# Patient Record
Sex: MALE | Race: WHITE
[De-identification: names, ages, dates, MRNs, and addresses within clinical notes are randomized per-mention and may not be internally consistent; named-entity substitution may affect disease eponyms.]

---

## 2019-01-25 ENCOUNTER — HOSPITAL ENCOUNTER (OUTPATIENT)
Dept: HOSPITAL 92 - TBSIIMAG | Age: 74
Discharge: HOME | End: 2019-01-25
Attending: NEUROLOGICAL SURGERY
Payer: MEDICARE

## 2019-01-25 DIAGNOSIS — M48.062: ICD-10-CM

## 2019-01-25 DIAGNOSIS — M51.26: Primary | ICD-10-CM

## 2019-01-25 DIAGNOSIS — M47.816: ICD-10-CM

## 2019-01-25 PROCEDURE — 72100 X-RAY EXAM L-S SPINE 2/3 VWS: CPT

## 2019-01-25 NOTE — RAD
LUMBAR SPINE SERIES THREE VIEWS INCLUDING LATERAL NEUTRAL, FLEXION AND EXTENSION VIEWS:

 

FINDINGS: 

Vertebral bodies maintain normal height. Moderately severe disc narrowing is seen at all of the verte
bral body levels. There is very limited motion on the extension or flexion views. It is difficult to 
show any definite abnormal motion. There appears to be a minimal spondylolisthesis of L5 on S1. I do 
not see that this has definitely excluded on the flexion or neutral views. 

 

IMPRESSION: 

Arthritic changes of the spine.

 

POS: DUARTE

## 2019-02-13 ENCOUNTER — HOSPITAL ENCOUNTER (OUTPATIENT)
Dept: HOSPITAL 92 - LABBT | Age: 74
Discharge: HOME | End: 2019-02-13
Attending: NEUROLOGICAL SURGERY
Payer: MEDICARE

## 2019-02-13 DIAGNOSIS — M48.061: ICD-10-CM

## 2019-02-13 DIAGNOSIS — Z01.812: Primary | ICD-10-CM

## 2019-02-13 LAB
ANION GAP SERPL CALC-SCNC: 13 MMOL/L (ref 10–20)
APTT PPP: 30.2 SEC (ref 22.9–36.1)
BUN SERPL-MCNC: 12 MG/DL (ref 8.4–25.7)
CALCIUM SERPL-MCNC: 9.8 MG/DL (ref 7.8–10.44)
CHLORIDE SERPL-SCNC: 101 MMOL/L (ref 98–107)
CO2 SERPL-SCNC: 28 MMOL/L (ref 23–31)
CREAT CL PREDICTED SERPL C-G-VRATE: 0 ML/MIN (ref 70–130)
GLUCOSE SERPL-MCNC: 132 MG/DL (ref 83–110)
HGB BLD-MCNC: 15.4 G/DL (ref 14–18)
INR PPP: 1
MCH RBC QN AUTO: 31.3 PG (ref 27–31)
MCV RBC AUTO: 94.9 FL (ref 78–98)
PLATELET # BLD AUTO: 284 THOU/UL (ref 130–400)
POTASSIUM SERPL-SCNC: 4.2 MMOL/L (ref 3.5–5.1)
PROTHROMBIN TIME: 13.6 SEC (ref 12–14.7)
RBC # BLD AUTO: 4.92 MILL/UL (ref 4.7–6.1)
SODIUM SERPL-SCNC: 138 MMOL/L (ref 136–145)
WBC # BLD AUTO: 11.4 THOU/UL (ref 4.8–10.8)

## 2019-02-13 PROCEDURE — 80048 BASIC METABOLIC PNL TOTAL CA: CPT

## 2019-02-13 PROCEDURE — 85610 PROTHROMBIN TIME: CPT

## 2019-02-13 PROCEDURE — 85730 THROMBOPLASTIN TIME PARTIAL: CPT

## 2019-02-13 PROCEDURE — 85027 COMPLETE CBC AUTOMATED: CPT

## 2019-02-13 NOTE — HP
HISTORY OF PRESENT ILLNESS:  Mr. Walsh was last seen in our office in , who

returns today for worsening back pain.  The patient states that he has been through

physical therapy, injections, and medications, and he is progressively getting

worse.  The patient states that he is unable to walk for any length of time and it

is difficult to stand straight up and he is unable to play golf now.  The patient

states that he gets pain in the low back and then worse when he stands.  The patient

is uncomfortable sitting or lying down.  He states that he has numbness in bilateral

toes and he is ready to discuss surgery. 



REVIEW OF SYSTEMS:  A 10-point review of systems has been completed and is negative

other than stated in the above HPI. 



PAST MEDICAL HISTORY:  Hypertension, prostate cancer, gout, measles, mumps, prostate

problems, hypertension, and diverticulitis. 



PAST SURGICAL HISTORY:  Prostate operation, hernia repair, colostomy, shoulder

surgery, tonsillectomy, colostomy reversal, and incisional hernia repair. 



FAMILY HISTORY:  Father was , diagnosed with dementia, hypertension, and

cancer.  Mother is alive at 91, diagnosed with hypertension.  Siblings are alive. 



SOCIAL HISTORY:  The patient is a former smoker.  Drinks alcohol occasionally.  Does

not use any other illicit drugs.  , is retired. 



MEDICATIONS:  

1. Allopurinol.

2. Furosemide.

3. Levothyroxine.

4. Sodium.

5. Irbesartan.



ALLERGIES:  PENICILLIN.



PHYSICAL EXAMINATION:

CONSTITUTIONAL:  Well appearing, well nourished, alert. 

NEUROLOGIC:  Mental status; oriented to time, place, and person.  Normal attention

span and concentration.  Speech is spontaneous and fluent.  Comprehension intact.

Content appropriate.  Normal fund of knowledge.  Cranial nerves; pupils are equal,

round, and reactive to light.  Extraocular movements are intact.  Hearing is intact.

 Motor; muscle strength normal in lower extremities.  Muscle tone and bulk normal in

lower extremities.  5/5 bilateral strength in IP, KE, KF, DF, PF, EHL.  No

radiculopathy.  Negative single leg raise.  Rotation of bilateral hips normal.

Tender to palpate at L5 and paraspinal muscles.  Deep tendon reflexes 2+ patellar,

2+ ankles bilaterally.  Sensory exam intact to light touch.  Normal gait and

station; sit to stand slow, normal gait, forward flexed, difficult to stand

straight. 

RESPIRATIONS:  Normal work of breathing on room air. 

SKIN:  No rash or lesions noted on exposed skin. 

PSYCH:  Normal mood and affect.



IMAGING:  Lumbar MRI shows L1-L2 and L4-L5 greater than L2-L3 central spinal

stenosis due to herniated nucleus pulposus. 



ASSESSMENT AND PLAN:  Lumbar spinal stenosis with neurogenic claudication.  Dr. Toussaint has offered surgery, laminectomy.  We have obtained informed consent.  We

have discussed the indications, risks, benefits, alternatives of surgery.  Risks

included, but were not limited to, bleeding, infection, CSF leak, nerve damage,

weakness, incontinence, cauda equina injury, arachnoiditis, paralysis, ventilator

dependence, wheelchair dependence, complications of anesthesia, or death.  The

patient states that he understands and is willing to proceed with surgery. 







Job ID:  [id number]

## 2019-02-14 ENCOUNTER — HOSPITAL ENCOUNTER (INPATIENT)
Dept: HOSPITAL 92 - SDC | Age: 74
LOS: 2 days | Discharge: HOME | DRG: 517 | End: 2019-02-16
Attending: NEUROLOGICAL SURGERY | Admitting: NEUROLOGICAL SURGERY
Payer: MEDICARE

## 2019-02-14 VITALS — BODY MASS INDEX: 37.2 KG/M2

## 2019-02-14 DIAGNOSIS — M48.062: Primary | ICD-10-CM

## 2019-02-14 PROCEDURE — 01NB0ZZ RELEASE LUMBAR NERVE, OPEN APPROACH: ICD-10-PCS | Performed by: NEUROLOGICAL SURGERY

## 2019-02-14 PROCEDURE — 76000 FLUOROSCOPY <1 HR PHYS/QHP: CPT

## 2019-02-14 PROCEDURE — 85027 COMPLETE CBC AUTOMATED: CPT

## 2019-02-14 PROCEDURE — 85730 THROMBOPLASTIN TIME PARTIAL: CPT

## 2019-02-14 PROCEDURE — 80048 BASIC METABOLIC PNL TOTAL CA: CPT

## 2019-02-14 PROCEDURE — 85610 PROTHROMBIN TIME: CPT

## 2019-02-14 RX ADMIN — CLINDAMYCIN PHOSPHATE SCH MLS: 900 INJECTION, SOLUTION INTRAVENOUS at 22:16

## 2019-02-14 NOTE — OP
DATE OF PROCEDURE:  02/14/2019



ASSISTANT:  Evette Merchant PA-C.



PREOPERATIVE INDICATION:  Treat pain and prevent neurological deterioration.



PREOPERATIVE DIAGNOSES:  Multilevel lumbar stenosis and lateral recess stenosis with

neurogenic claudication. 



POSTOPERATIVE DIAGNOSES:  Multilevel lumbar stenosis and lateral recess stenosis

with neurogenic claudication. 



PROCEDURE PERFORMED:  Decompressive laminectomy, medial facetectomy, and

foraminotomy at L1-L2, L2-L3, L4-L5. 



PREOPERATIVE MEDICATIONS:  Ancef 2 g IV.



DRAIN NUMBER:  1.



DRAIN TYPE:  10-Urdu Talon.



DESCRIPTION OF PROCEDURE:  The patient was brought to the operating room.  General

endotracheal anesthesia was induced.  The patient was positioned prone on the

operating table with his chest and hips supported by gel-filled chest rolls.  A

lateral fluoro radiograph was used to plan our incision.  The lumbar skin was shaved

and sterilely prepped and draped.  We opened our incision with a 10-blade knife and

controlled bleeding with bipolar and monopolar cautery.  We used monopolar cautery

to dissect through copious amounts of subcutaneous tissues all the way to the

thoracodorsal fascia.  We incised the fascia in the midline and first we reflected

the paraspinal muscles off the spinous process and lamina from L1-L3.  A

self-retaining retractor was placed and a lateral fluoro radiograph confirmed the

levels upon which we were operating.  We then used an Adson rongeur to remove the

spinous processes at L1-L2 and the top of L3.  High-speed drill was used to thin the

lamina.  With Kerrison rongeurs, we fashioned a laminectomy.  We widened our

laminectomy defect.  At each of the two interspaces, we performed medial

facetectomies.  We made sure that the L2 and L3 nerve roots could pass over the

interspace and out of their respective foramina without impingement.  With the

decompression secured, we controlled ventral epidural bleeding with gentle bipolar

cautery.  We waxed the bone edges.  We turned our attention to L4-L5.  Here, we made

a separate fascial incision.  We reflected the paraspinal muscles off the spinous

process and laminae of L4 and L5.  A self-retaining retractor was placed here.  A

lateral fluoro radiograph once again confirmed the levels upon which we were

operating.  With Adson rongeur, we removed the L4 spinous process on the top of L5.

A high-speed drill to thin the L4 lamina.  We used Kerrison rongeur to fashion a

laminectomy at L4 and the top of L5.  At the interspace, we performed medial

facetectomies until the L5 nerve roots were well decompressed at the level of the

interspace.  We performed foraminotomies over the exiting points of the nerve roots.

 A Gauthier ball probe could pass through the lateral recess and out the foramina

without impingement.  We irrigated copiously with bacitracin irrigation.  We waxed

the bone edges.  We controlled the epidural bleeding with gentle bipolar cautery.

We tunneled the drain inferiorly through a separate stab incision.  We treated the

wound with vancomycin powder.  We infused local anesthetic in the paraspinal

muscles.  This was all done after copious amounts of irrigation with bacitracin

irrigation.  We closed the wound in anatomical layers and we applied a sterile

dressing.  This was a clean case, no contamination. 







Job ID:  [id number]

## 2019-02-15 RX ADMIN — ACETAMINOPHEN AND CODEINE PHOSPHATE PRN TAB: 300; 30 TABLET ORAL at 13:50

## 2019-02-15 RX ADMIN — ACETAMINOPHEN AND CODEINE PHOSPHATE PRN TAB: 300; 30 TABLET ORAL at 16:18

## 2019-02-15 RX ADMIN — ACETAMINOPHEN AND CODEINE PHOSPHATE PRN TAB: 300; 30 TABLET ORAL at 22:01

## 2019-02-15 RX ADMIN — CLINDAMYCIN PHOSPHATE SCH MLS: 900 INJECTION, SOLUTION INTRAVENOUS at 05:16

## 2019-02-15 RX ADMIN — CLINDAMYCIN PHOSPHATE SCH MLS: 900 INJECTION, SOLUTION INTRAVENOUS at 21:50

## 2019-02-15 RX ADMIN — CLINDAMYCIN PHOSPHATE SCH MLS: 900 INJECTION, SOLUTION INTRAVENOUS at 13:48

## 2019-02-15 RX ADMIN — ACETAMINOPHEN AND CODEINE PHOSPHATE PRN TAB: 300; 30 TABLET ORAL at 11:05

## 2019-02-15 NOTE — PRG
DATE OF SERVICE:  02/15/2019



Mr. Walsh is seen this morning in his hospital room.  He is up yesterday after a

surgery, walking the hallways.  Overnight, he began to be more and more aware of

some left thigh numbness.  The numbness was a disconcerting and dysesthetic type

numbness in the distribution of the lateral femoral cutaneous nerve.  It is more so

on the left than the right.  The right is only mildly affected.  He is concerned

about this and I kept him up all night.  This morning I am seeing him and he still

has the same complaint.  I do not see any fevers recorded on his listed vital signs.

 His blood pressures have been in the normal range.  He has had a significant amount

out of his incisional drain.  He has good strength in the iliopsoas, the quadriceps

anterior to the EHL and the gastroc.  There is numbness on the left greater than the

right in the lateral femoral cutaneous distribution across his multiple dermatomes. 



I told Mr. Walsh that with his body habitus size and the fact that he was prone for

a significant amount of time for his operation that he has a high risk for meralgia

paresthetica from positioning on the operating table and I believe that is what this

is.  After a talk is very reassured, he stood up out of his chair and he went for a

walk around the hallways.  His drain is in.  He is doing reasonably well with the

exception of that thigh numbness that is bothersome to him.  We will have him

continue to walk this morning.  If his drain tapers off to less than 30 mL over 6

hours and it can be removed.  He can be discharged home when it is safe for

activities of daily living.  The numbness will last for weeks but slowly get better

over time. 







Job ID:  [id number]

## 2019-02-16 VITALS — SYSTOLIC BLOOD PRESSURE: 121 MMHG | TEMPERATURE: 97.2 F | DIASTOLIC BLOOD PRESSURE: 64 MMHG

## 2019-02-16 RX ADMIN — CLINDAMYCIN PHOSPHATE SCH MLS: 900 INJECTION, SOLUTION INTRAVENOUS at 05:40

## 2019-02-16 RX ADMIN — ACETAMINOPHEN AND CODEINE PHOSPHATE PRN TAB: 300; 30 TABLET ORAL at 06:00

## 2019-02-16 RX ADMIN — ACETAMINOPHEN AND CODEINE PHOSPHATE PRN TAB: 300; 30 TABLET ORAL at 08:45

## 2019-02-16 NOTE — PRG
DATE OF SERVICE:  02/16/2019



Mr. Walsh is hospital day 3 for a multilevel lumbar decompression.  His drain

output is dwindled to only 40 mL over the last shift and his wound looks excellent

and is dry.  He is already mobilizing, voiding on his own and tolerating oral diet

and I think he is ready to be dismissed.  They are in agreement.  We will remove his

drain and plan for discharge.  We went over postoperative issues. 







Job ID:  [id number]

## 2019-07-10 ENCOUNTER — HOSPITAL ENCOUNTER (EMERGENCY)
Dept: HOSPITAL 92 - ERS | Age: 74
Discharge: HOME | End: 2019-07-10
Payer: MEDICARE

## 2019-07-10 DIAGNOSIS — R07.89: Primary | ICD-10-CM

## 2019-07-10 DIAGNOSIS — M10.9: ICD-10-CM

## 2019-07-10 LAB
ALBUMIN SERPL BCG-MCNC: 4.2 G/DL (ref 3.4–4.8)
ALP SERPL-CCNC: 64 U/L (ref 40–150)
ALT SERPL W P-5'-P-CCNC: 60 U/L (ref 8–55)
ANION GAP SERPL CALC-SCNC: 13 MMOL/L (ref 10–20)
AST SERPL-CCNC: 38 U/L (ref 5–34)
BASOPHILS # BLD AUTO: 0.1 THOU/UL (ref 0–0.2)
BASOPHILS NFR BLD AUTO: 0.6 % (ref 0–1)
BILIRUB SERPL-MCNC: 0.8 MG/DL (ref 0.2–1.2)
BUN SERPL-MCNC: 14 MG/DL (ref 8.4–25.7)
CALCIUM SERPL-MCNC: 9.1 MG/DL (ref 7.8–10.44)
CHLORIDE SERPL-SCNC: 104 MMOL/L (ref 98–107)
CK SERPL-CCNC: 117 U/L (ref 30–200)
CO2 SERPL-SCNC: 25 MMOL/L (ref 23–31)
CREAT CL PREDICTED SERPL C-G-VRATE: 0 ML/MIN (ref 70–130)
EOSINOPHIL # BLD AUTO: 0.2 THOU/UL (ref 0–0.7)
EOSINOPHIL NFR BLD AUTO: 1.7 % (ref 0–10)
GLOBULIN SER CALC-MCNC: 3 G/DL (ref 2.4–3.5)
GLUCOSE SERPL-MCNC: 150 MG/DL (ref 83–110)
HGB BLD-MCNC: 15.1 G/DL (ref 14–18)
LYMPHOCYTES # BLD: 1.8 THOU/UL (ref 1.2–3.4)
LYMPHOCYTES NFR BLD AUTO: 18.7 % (ref 21–51)
MCH RBC QN AUTO: 30.3 PG (ref 27–31)
MCV RBC AUTO: 92.7 FL (ref 78–98)
MONOCYTES # BLD AUTO: 0.6 THOU/UL (ref 0.11–0.59)
MONOCYTES NFR BLD AUTO: 6.4 % (ref 0–10)
NEUTROPHILS # BLD AUTO: 7.1 THOU/UL (ref 1.4–6.5)
NEUTROPHILS NFR BLD AUTO: 72.6 % (ref 42–75)
PLATELET # BLD AUTO: 242 THOU/UL (ref 130–400)
POTASSIUM SERPL-SCNC: 4.2 MMOL/L (ref 3.5–5.1)
RBC # BLD AUTO: 4.98 MILL/UL (ref 4.7–6.1)
SODIUM SERPL-SCNC: 138 MMOL/L (ref 136–145)
WBC # BLD AUTO: 9.7 THOU/UL (ref 4.8–10.8)

## 2019-07-10 PROCEDURE — 85025 COMPLETE CBC W/AUTO DIFF WBC: CPT

## 2019-07-10 PROCEDURE — 36415 COLL VENOUS BLD VENIPUNCTURE: CPT

## 2019-07-10 PROCEDURE — 84484 ASSAY OF TROPONIN QUANT: CPT

## 2019-07-10 PROCEDURE — 93005 ELECTROCARDIOGRAM TRACING: CPT

## 2019-07-10 PROCEDURE — 80053 COMPREHEN METABOLIC PANEL: CPT

## 2019-07-10 PROCEDURE — 82550 ASSAY OF CK (CPK): CPT

## 2019-07-10 PROCEDURE — 71046 X-RAY EXAM CHEST 2 VIEWS: CPT

## 2019-07-10 NOTE — RAD
CHEST 2 VIEWS:

 

Date:  07/10/19 

 

HISTORY:  

Chest pain. 

 

COMPARISON:  

12/18/18. 

 

FINDINGS:

Heart size is within normal limits. The lungs show no acute process. No confluent pneumonia, overt ed
ema, or pleural effusion. 

 

IMPRESSION: 

No acute intrathoracic disease. Stable from prior study. Atherosclerosis of aorta. 

 

 

 

POS: C

## 2020-01-16 ENCOUNTER — HOSPITAL ENCOUNTER (OUTPATIENT)
Dept: HOSPITAL 92 - TBSIIMAG | Age: 75
Discharge: HOME | End: 2020-01-16
Attending: NEUROLOGICAL SURGERY
Payer: MEDICARE

## 2020-01-16 DIAGNOSIS — M54.5: Primary | ICD-10-CM

## 2020-01-16 DIAGNOSIS — M43.16: ICD-10-CM

## 2020-01-16 DIAGNOSIS — M47.816: ICD-10-CM

## 2020-01-16 PROCEDURE — 72100 X-RAY EXAM L-S SPINE 2/3 VWS: CPT

## 2020-01-16 NOTE — RAD
SIX VIEWS LUMBAR SPINE:



HISTORY: 

Low back pain. Back surgery one year ago.



FINDINGS: 

On the AP projection, 5 lumbar-type vertebrae. Lumbar spine vertebral body height is maintained. No f
racture. Moderate loss of disc space height and osteophyte formation in the distal thoracic spine,

thoracolumbar junction and upper lumbar spine.



Oblique projections do not demonstrate any spondylolysis.



Spondylolisthesis:

L2-L3: Neutral: 4.5 mm of retrolisthesis; flexion: 4.5 mm of retrolisthesis; extension: 5.2 mm of ret
rolisthesis.



IMPRESSION:

1. Multilevel degenerative changes of the lumbar spine as above.

2. Grade 1 retrolisthesis of a 2 upon L3, without associated spondylolysis. No significant change in 
the overall degree of spondylolisthesis upon extension or flexion.



Transcribed Date/Time: 1/16/2020 10:35 AM



Reported By: Judy Moreno 

Electronically Signed:  1/16/2020 10:58 AM